# Patient Record
Sex: MALE | Race: WHITE | NOT HISPANIC OR LATINO | Employment: OTHER | URBAN - METROPOLITAN AREA
[De-identification: names, ages, dates, MRNs, and addresses within clinical notes are randomized per-mention and may not be internally consistent; named-entity substitution may affect disease eponyms.]

---

## 2017-09-30 ENCOUNTER — HOSPITAL ENCOUNTER (EMERGENCY)
Facility: HOSPITAL | Age: 27
Discharge: HOME | End: 2017-10-01
Attending: EMERGENCY MEDICINE

## 2017-09-30 DIAGNOSIS — J02.9 PHARYNGITIS, UNSPECIFIED ETIOLOGY: Primary | ICD-10-CM

## 2017-09-30 DIAGNOSIS — D72.829 LEUKOCYTOSIS, UNSPECIFIED TYPE: ICD-10-CM

## 2017-09-30 DIAGNOSIS — Z72.0 TOBACCO ABUSE: ICD-10-CM

## 2017-09-30 DIAGNOSIS — R11.10 VOMITING: ICD-10-CM

## 2017-09-30 PROCEDURE — 96367 TX/PROPH/DG ADDL SEQ IV INF: CPT

## 2017-09-30 PROCEDURE — 99285 EMERGENCY DEPT VISIT HI MDM: CPT | Mod: 25

## 2017-09-30 PROCEDURE — 96365 THER/PROPH/DIAG IV INF INIT: CPT

## 2017-09-30 PROCEDURE — 96375 TX/PRO/DX INJ NEW DRUG ADDON: CPT

## 2017-09-30 RX ORDER — MORPHINE SULFATE 4 MG/ML
4 INJECTION, SOLUTION INTRAMUSCULAR; INTRAVENOUS
Status: COMPLETED | OUTPATIENT
Start: 2017-10-01 | End: 2017-10-01

## 2017-09-30 RX ORDER — PANTOPRAZOLE SODIUM 40 MG/10ML
40 INJECTION, POWDER, LYOPHILIZED, FOR SOLUTION INTRAVENOUS
Status: COMPLETED | OUTPATIENT
Start: 2017-10-01 | End: 2017-10-01

## 2017-10-01 VITALS
BODY MASS INDEX: 24.25 KG/M2 | OXYGEN SATURATION: 96 % | DIASTOLIC BLOOD PRESSURE: 65 MMHG | HEART RATE: 95 BPM | RESPIRATION RATE: 18 BRPM | TEMPERATURE: 98 F | SYSTOLIC BLOOD PRESSURE: 108 MMHG | WEIGHT: 160 LBS | HEIGHT: 68 IN

## 2017-10-01 LAB
ALBUMIN SERPL BCP-MCNC: 3.9 G/DL
ALP SERPL-CCNC: 81 U/L
ALT SERPL W/O P-5'-P-CCNC: 14 U/L
ANION GAP SERPL CALC-SCNC: 14 MMOL/L
AST SERPL-CCNC: 19 U/L
BASOPHILS # BLD AUTO: 0.04 K/UL
BASOPHILS NFR BLD: 0.2 %
BILIRUB SERPL-MCNC: 0.6 MG/DL
BILIRUB UR QL STRIP: NEGATIVE
BUN SERPL-MCNC: 14 MG/DL
CALCIUM SERPL-MCNC: 9.4 MG/DL
CHLORIDE SERPL-SCNC: 98 MMOL/L
CLARITY UR: CLEAR
CO2 SERPL-SCNC: 20 MMOL/L
COLOR UR: YELLOW
CREAT SERPL-MCNC: 1 MG/DL
DEPRECATED S PYO AG THROAT QL EIA: NEGATIVE
DIFFERENTIAL METHOD: ABNORMAL
EOSINOPHIL # BLD AUTO: 0 K/UL
EOSINOPHIL NFR BLD: 0 %
ERYTHROCYTE [DISTWIDTH] IN BLOOD BY AUTOMATED COUNT: 13.1 %
EST. GFR  (AFRICAN AMERICAN): >60 ML/MIN/1.73 M^2
EST. GFR  (NON AFRICAN AMERICAN): >60 ML/MIN/1.73 M^2
GLUCOSE SERPL-MCNC: 118 MG/DL
GLUCOSE UR QL STRIP: NEGATIVE
HCT VFR BLD AUTO: 43.6 %
HGB BLD-MCNC: 15.7 G/DL
HGB UR QL STRIP: ABNORMAL
KETONES UR QL STRIP: ABNORMAL
LACTATE SERPL-SCNC: 0.9 MMOL/L
LEUKOCYTE ESTERASE UR QL STRIP: NEGATIVE
LIPASE SERPL-CCNC: 12 U/L
LYMPHOCYTES # BLD AUTO: 1.2 K/UL
LYMPHOCYTES NFR BLD: 5.2 %
MCH RBC QN AUTO: 29.3 PG
MCHC RBC AUTO-ENTMCNC: 36 G/DL
MCV RBC AUTO: 82 FL
MONOCYTES # BLD AUTO: 1.8 K/UL
MONOCYTES NFR BLD: 7.4 %
NEUTROPHILS # BLD AUTO: 21 K/UL
NEUTROPHILS NFR BLD: 87.7 %
NITRITE UR QL STRIP: NEGATIVE
PH UR STRIP: 6 [PH] (ref 5–8)
PLATELET # BLD AUTO: 164 K/UL
PMV BLD AUTO: 11.6 FL
POTASSIUM SERPL-SCNC: 3.7 MMOL/L
PROT SERPL-MCNC: 7.8 G/DL
PROT UR QL STRIP: ABNORMAL
RBC # BLD AUTO: 5.35 M/UL
SODIUM SERPL-SCNC: 132 MMOL/L
SP GR UR STRIP: <=1.005 (ref 1–1.03)
URN SPEC COLLECT METH UR: ABNORMAL
UROBILINOGEN UR STRIP-ACNC: NEGATIVE EU/DL
WBC # BLD AUTO: 24.05 K/UL

## 2017-10-01 PROCEDURE — 87086 URINE CULTURE/COLONY COUNT: CPT

## 2017-10-01 PROCEDURE — 80053 COMPREHEN METABOLIC PANEL: CPT

## 2017-10-01 PROCEDURE — 63600175 PHARM REV CODE 636 W HCPCS: Performed by: EMERGENCY MEDICINE

## 2017-10-01 PROCEDURE — 25000003 PHARM REV CODE 250: Performed by: EMERGENCY MEDICINE

## 2017-10-01 PROCEDURE — 83690 ASSAY OF LIPASE: CPT

## 2017-10-01 PROCEDURE — 87147 CULTURE TYPE IMMUNOLOGIC: CPT

## 2017-10-01 PROCEDURE — 85025 COMPLETE CBC W/AUTO DIFF WBC: CPT

## 2017-10-01 PROCEDURE — 81003 URINALYSIS AUTO W/O SCOPE: CPT

## 2017-10-01 PROCEDURE — 87040 BLOOD CULTURE FOR BACTERIA: CPT

## 2017-10-01 PROCEDURE — 87081 CULTURE SCREEN ONLY: CPT

## 2017-10-01 PROCEDURE — 25500020 PHARM REV CODE 255: Performed by: EMERGENCY MEDICINE

## 2017-10-01 PROCEDURE — C9113 INJ PANTOPRAZOLE SODIUM, VIA: HCPCS | Performed by: EMERGENCY MEDICINE

## 2017-10-01 PROCEDURE — 87880 STREP A ASSAY W/OPTIC: CPT

## 2017-10-01 PROCEDURE — 83605 ASSAY OF LACTIC ACID: CPT

## 2017-10-01 RX ORDER — ONDANSETRON 4 MG/1
4 TABLET, ORALLY DISINTEGRATING ORAL EVERY 8 HOURS PRN
Qty: 12 TABLET | Refills: 0 | Status: SHIPPED | OUTPATIENT
Start: 2017-10-01

## 2017-10-01 RX ORDER — FAMOTIDINE 20 MG/1
20 TABLET, FILM COATED ORAL 2 TIMES DAILY
Qty: 60 TABLET | Refills: 0 | Status: SHIPPED | OUTPATIENT
Start: 2017-10-01 | End: 2017-10-31

## 2017-10-01 RX ORDER — ACETAMINOPHEN 500 MG
500 TABLET ORAL
Status: COMPLETED | OUTPATIENT
Start: 2017-10-01 | End: 2017-10-01

## 2017-10-01 RX ADMIN — SODIUM CHLORIDE 2000 ML: 0.9 INJECTION, SOLUTION INTRAVENOUS at 12:10

## 2017-10-01 RX ADMIN — SODIUM CHLORIDE 500 ML: 900 INJECTION, SOLUTION INTRAVENOUS at 02:10

## 2017-10-01 RX ADMIN — IOHEXOL 40 ML: 350 INJECTION, SOLUTION INTRAVENOUS at 02:10

## 2017-10-01 RX ADMIN — MORPHINE SULFATE 4 MG: 4 INJECTION, SOLUTION INTRAMUSCULAR; INTRAVENOUS at 12:10

## 2017-10-01 RX ADMIN — ACETAMINOPHEN 500 MG: 500 TABLET ORAL at 02:10

## 2017-10-01 RX ADMIN — PANTOPRAZOLE SODIUM 40 MG: 40 INJECTION, POWDER, FOR SOLUTION INTRAVENOUS at 12:10

## 2017-10-01 RX ADMIN — CEFTRIAXONE 1 G: 1 INJECTION, SOLUTION INTRAVENOUS at 02:10

## 2017-10-01 RX ADMIN — IOHEXOL 60 ML: 350 INJECTION, SOLUTION INTRAVENOUS at 02:10

## 2017-10-01 RX ADMIN — PROMETHAZINE HYDROCHLORIDE 12.5 MG: 25 INJECTION INTRAMUSCULAR; INTRAVENOUS at 12:10

## 2017-10-01 NOTE — ED PROVIDER NOTES
SCRIBE #1 NOTE: I, Bitatanya Addison, am scribing for, and in the presence of, Mayelin Martinez DO. I have scribed the entire note.      History      Chief Complaint   Patient presents with    Emesis     Pt reports emesis x1 day, +body aches/chills. +sore throat       Review of patient's allergies indicates:  No Known Allergies     HPI   HPI    9/30/2017, 11:26 PM   History obtained from the patient and girlfriend      History of Present Illness: Wellington Gutiérrez is a 27 y.o. male patient who presents to the Emergency Department for emesis which onset suddenly 1 day PTA. Pt currently rates his pain a 10/10 in severity. Symptoms are episodic and moderate in severity. Pt's girlfriend states pt has had multiple episodes of emesis and one episode of black emesis PTA. No mitigating or exacerbating factors reported. Associated sxs include nausea, abdominal pain, generalized myalgias, fatigue, sore throat, chills, and subjective fever. Patient denies any constipation, diarrhea, hematochezia, dysuria, hematuria, frequency, neck pain/stiffness, HA, and all other sxs at this time. No further complaints or concerns at this time.       Arrival mode: Personal vehicle    PCP: Not given      Past Medical History:  History reviewed. No pertinent past medical history.    Past Surgical History:  History reviewed. No pertinent surgical history.      Family History:  History reviewed. No pertinent family history.    Social History:  Social History     Social History Main Topics    Smoking status: Current Every Day Smoker     Packs/day: 0.50     Types: Cigarettes    Smokeless tobacco: Never Used    Alcohol use No    Drug use: Unknown    Sexual activity: unknown       ROS   Review of Systems   Constitutional: Positive for chills, fatigue and fever (subjective).   HENT: Positive for sore throat.    Respiratory: Negative for shortness of breath.    Cardiovascular: Negative for chest pain.   Gastrointestinal: Positive for abdominal pain  "(generalized), nausea and vomiting. Negative for blood in stool, constipation and diarrhea.   Genitourinary: Negative for dysuria, frequency and hematuria.   Musculoskeletal: Positive for myalgias (generalized). Negative for back pain, neck pain and neck stiffness.   Skin: Negative for rash.   Neurological: Negative for weakness and headaches.   Hematological: Does not bruise/bleed easily.   All other systems reviewed and are negative.      Physical Exam      Initial Vitals [09/30/17 2313]   BP Pulse Resp Temp SpO2   124/74 96 20 99.4 °F (37.4 °C) 96 %      MAP       90.67          Physical Exam  Nursing Notes and Vital Signs Reviewed.  Constitutional: Patient is in mild distress. Well-developed and well-nourished.  Head: Atraumatic. Normocephalic.  Eyes: PERRL. EOM intact. Conjunctivae are not pale. No scleral icterus.  ENT: Mucous membranes are dry and cracked. Oropharynx is clear and symmetric. Pharyngeal erythema of soft palate. No drooling. No trismus. However voice sounds "hot potato".   Neck: Supple. Full ROM. No lymphadenopathy. No crepitance.   Cardiovascular: Regular rate. Regular rhythm. No murmurs, rubs, or gallops. Distal pulses are 2+ and symmetric.  Pulmonary/Chest: Tachypnea. Clear to auscultation bilaterally. No wheezing, rales, or rhonchi.  Abdominal: Old surgical scar across abdomen. Soft and non-distended.  There is generalized tenderness.  No rebound, guarding, or rigidity. Good bowel sounds. No masses.  Musculoskeletal: Moves all extremities. No obvious deformities. No edema. No calf tenderness.  Skin: Warm and dry.  Neurological:  Alert, awake, and appropriate.  Normal speech.  No acute focal neurological deficits are appreciated.  Psychiatric: Normal affect. Good eye contact. Appropriate in content.    ED Course    Procedures  ED Vital Signs:  Vitals:    09/30/17 2313 10/01/17 0105 10/01/17 0219 10/01/17 0235   BP: 124/74 110/78  108/62   Pulse: 96 103     Resp: 20 (!) 22     Temp: 99.4 °F " "(37.4 °C) 100.2 °F (37.9 °C) 98.5 °F (36.9 °C)    TempSrc: Oral Oral Oral    SpO2: 96% 97%     Weight: 72.6 kg (160 lb)      Height: 5' 8" (1.727 m)       10/01/17 0236 10/01/17 0319   BP:  108/65   Pulse: 87 95   Resp: 17 18   Temp:  98.4 °F (36.9 °C)   TempSrc:  Oral   SpO2: 98% 96%   Weight:     Height:         Abnormal Lab Results:  Labs Reviewed   CBC W/ AUTO DIFFERENTIAL - Abnormal; Notable for the following:        Result Value    WBC 24.05 (*)     Gran # 21.0 (*)     Mono # 1.8 (*)     Gran% 87.7 (*)     Lymph% 5.2 (*)     All other components within normal limits   COMPREHENSIVE METABOLIC PANEL - Abnormal; Notable for the following:     Sodium 132 (*)     CO2 20 (*)     Glucose 118 (*)     All other components within normal limits   URINALYSIS - Abnormal; Notable for the following:     Specific Gravity, UA <=1.005 (*)     Protein, UA Trace (*)     Ketones, UA 2+ (*)     Occult Blood UA Trace (*)     All other components within normal limits   THROAT SCREEN, RAPID   CULTURE, STREP A,  THROAT   CULTURE, BLOOD   CULTURE, BLOOD   CULTURE, URINE   LIPASE   LACTIC ACID, PLASMA        All Lab Results:  Results for orders placed or performed during the hospital encounter of 09/30/17   Rapid strep screen   Result Value Ref Range    Rapid Strep A Screen Negative Negative   CBC auto differential   Result Value Ref Range    WBC 24.05 (H) 3.90 - 12.70 K/uL    RBC 5.35 4.60 - 6.20 M/uL    Hemoglobin 15.7 14.0 - 18.0 g/dL    Hematocrit 43.6 40.0 - 54.0 %    MCV 82 82 - 98 fL    MCH 29.3 27.0 - 31.0 pg    MCHC 36.0 32.0 - 36.0 g/dL    RDW 13.1 11.5 - 14.5 %    Platelets 164 150 - 350 K/uL    MPV 11.6 9.2 - 12.9 fL    Gran # 21.0 (H) 1.8 - 7.7 K/uL    Lymph # 1.2 1.0 - 4.8 K/uL    Mono # 1.8 (H) 0.3 - 1.0 K/uL    Eos # 0.0 0.0 - 0.5 K/uL    Baso # 0.04 0.00 - 0.20 K/uL    Gran% 87.7 (H) 38.0 - 73.0 %    Lymph% 5.2 (L) 18.0 - 48.0 %    Mono% 7.4 4.0 - 15.0 %    Eosinophil% 0.0 0.0 - 8.0 %    Basophil% 0.2 0.0 - 1.9 %    " Differential Method Automated    Comprehensive metabolic panel   Result Value Ref Range    Sodium 132 (L) 136 - 145 mmol/L    Potassium 3.7 3.5 - 5.1 mmol/L    Chloride 98 95 - 110 mmol/L    CO2 20 (L) 23 - 29 mmol/L    Glucose 118 (H) 70 - 110 mg/dL    BUN, Bld 14 6 - 20 mg/dL    Creatinine 1.0 0.5 - 1.4 mg/dL    Calcium 9.4 8.7 - 10.5 mg/dL    Total Protein 7.8 6.0 - 8.4 g/dL    Albumin 3.9 3.5 - 5.2 g/dL    Total Bilirubin 0.6 0.1 - 1.0 mg/dL    Alkaline Phosphatase 81 55 - 135 U/L    AST 19 10 - 40 U/L    ALT 14 10 - 44 U/L    Anion Gap 14 8 - 16 mmol/L    eGFR if African American >60 >60 mL/min/1.73 m^2    eGFR if non African American >60 >60 mL/min/1.73 m^2   Lipase   Result Value Ref Range    Lipase 12 4 - 60 U/L   Urinalysis   Result Value Ref Range    Specimen UA Urine, Clean Catch     Color, UA Yellow Yellow, Straw, Yoon    Appearance, UA Clear Clear    pH, UA 6.0 5.0 - 8.0    Specific Gravity, UA <=1.005 (A) 1.005 - 1.030    Protein, UA Trace (A) Negative    Glucose, UA Negative Negative    Ketones, UA 2+ (A) Negative    Bilirubin (UA) Negative Negative    Occult Blood UA Trace (A) Negative    Nitrite, UA Negative Negative    Urobilinogen, UA Negative <2.0 EU/dL    Leukocytes, UA Negative Negative   Lactic acid, plasma   Result Value Ref Range    Lactate (Lactic Acid) 0.9 0.5 - 2.2 mmol/L       Imaging Results:  Imaging Results          CT Abdomen Pelvis With Contrast (In process)                CT Chest With Contrast (In process)                CT Soft Tissue Neck With Contrast (In process)                2:40 AM: Per STAT radiology, pt's Neck CT results: Prominence is noted of the palatine tonsils. No definite evidence for low density collection to suggest an abscess at this time. Multiple mildly prominent nonspecific nodes along the jugular chains and posterior cervical triangles.     2:54 AM:  Per STAT radiology, pt's CT results:  CT Chest: Mild atelectasis at the dependent aspects of the lungs.  Nonspecific 4 mm soft tissue density nodule suspected in the right lower lobe. Follow-up per final report.  CT Abdomen and Pelvis: Evaluation of the stomach is limited secondary to poor distension. Some wall thickening of the stomach cannot be excluded.         The Emergency Provider reviewed the vital signs and test results, which are outlined above.    ED Discussion     01:20 AM:  Patient now with SIRS criteria, reviewed WBC. Unknown source of infection identified as this time:  No sepsis  - symptoms are suggestive of gastroenteritis as patient has has multiple episodes of vomiting with blood tinged. Will add blood cultures, lactic acid, urine culture, and rocephin.  DD - gastroenteritis, retropharyngeal abscess, gastritis, mediastinal air, perforated ulcer.   Will continue to monitor clinic course.    01:47 AM: Patient and girlfriend updated on results. Informed pt and friend of elevated liver enzymes and discussed precautions.      3:13 AM: There is no source of infection. No air in mediastinum. No free air. No signs of sepsis at this point in time. No severe dehydration. Reassessed pt at this time. No drooling. Repeat Abdominal Exam:  Soft and non-distended.  There is no tenderness. No rebound, guarding, or rigidity. No organomegaly. No pulsatile mass. Normal bowel sounds.  Pt states his condition has improved at this time. Discussed with pt all pertinent ED information and results. Discussed pt dx and plan of tx. Gave pt all f/u and return to the ED instructions. All questions and concerns were addressed at this time. Pt expresses understanding of information and instructions, and is comfortable with plan to discharge. Pt is stable for discharge.    I discussed with patient and/or family/caretaker that evaluation in the ED does not suggest any emergent or life threatening medical conditions requiring immediate intervention beyond what was provided in the ED, and I believe patient is safe for discharge.   Regardless, an unremarkable evaluation in the ED does not preclude the development or presence of a serious of life threatening condition. As such, patient was instructed to return immediately for any worsening or change in current symptoms.      ED Medication(s):  Medications   sodium chloride 0.9% bolus 2,000 mL (0 mLs Intravenous Stopped 10/1/17 0105)   promethazine (PHENERGAN) 12.5 mg in dextrose 5 % 50 mL IVPB (0 mg Intravenous Stopped 10/1/17 0040)   morphine injection 4 mg (4 mg Intravenous Given 10/1/17 0016)   pantoprazole injection 40 mg (40 mg Intravenous Given 10/1/17 0016)   acetaminophen tablet 500 mg (500 mg Oral Given 10/1/17 0219)   sodium chloride 0.9% bolus 500 mL (0 mLs Intravenous Stopped 10/1/17 0301)   cefTRIAXone (ROCEPHIN) 1 g in dextrose 5 % 50 mL IVPB (0 g Intravenous Stopped 10/1/17 0301)   omnipaque 350 iohexol 40 mL (40 mLs Intravenous Given 10/1/17 0210)   omnipaque 350 iohexol 60 mL (60 mLs Intravenous Given 10/1/17 0211)       New Prescriptions    ACETAMINOPHEN-CODEINE 120-12 MG/5 ML SUSPENSION    Take 10 mLs by mouth every 6 (six) hours as needed for Pain.    FAMOTIDINE (PEPCID) 20 MG TABLET    Take 1 tablet (20 mg total) by mouth 2 (two) times daily.    ONDANSETRON (ZOFRAN-ODT) 4 MG TBDL    Take 1 tablet (4 mg total) by mouth every 8 (eight) hours as needed.       Follow-up Information     [facility] -  In 1 day.    Specialty:  Emergency Medicine  Why:  Avoid red sauces, caffiene, tobacco, NSAIDS.  Return to the ED for:  nausea, vomiting, blood in stool, weakness, fever, confusion  Contact information:  [address]  [facility] [address] [phone]           Primary Care Provider of Choice. Schedule an appointment as soon as possible for a visit in 2 days.    Contact information:  [phone] for appointment.                   Medical Decision Making    Medical Decision Making:   Clinical Tests:   Lab Tests: Ordered and  Reviewed  Radiological Study: Ordered and Reviewed     Additional MDM:   Smoking Cessation: The patient is a smoker. The patient was counseled on smoking cessation for: 3 minutes. The patient was counseled on tobacco related  health complications.        Scribe Attestation:   Scribe #1: I performed the above scribed service and the documentation accurately describes the services I performed. I attest to the accuracy of the note.    Attending:   Physician Attestation Statement for Scribe #1: I, Mayelin Martinez DO, personally performed the services described in this documentation, as scribed by Bita Addison, in my presence, and it is both accurate and complete.          Clinical Impression       ICD-10-CM ICD-9-CM   1. Pharyngitis, unspecified etiology J02.9 462   2. Vomiting R11.10 787.03   3. Leukocytosis, unspecified type D72.829 288.60   4. Tobacco abuse Z72.0 305.1       Disposition:   Disposition: Discharged  Condition: Stable         Mayelin Martinez DO  10/01/17 0322

## 2017-10-03 LAB
BACTERIA THROAT CULT: NORMAL
BACTERIA UR CULT: NO GROWTH

## 2017-10-06 LAB
BACTERIA BLD CULT: NORMAL
BACTERIA BLD CULT: NORMAL

## 2018-05-30 ENCOUNTER — HOSPITAL ENCOUNTER (EMERGENCY)
Facility: HOSPITAL | Age: 28
Discharge: HOME | End: 2018-05-30
Attending: EMERGENCY MEDICINE

## 2018-05-30 VITALS
RESPIRATION RATE: 16 BRPM | DIASTOLIC BLOOD PRESSURE: 77 MMHG | TEMPERATURE: 97 F | OXYGEN SATURATION: 97 % | HEART RATE: 84 BPM | SYSTOLIC BLOOD PRESSURE: 132 MMHG

## 2018-05-30 DIAGNOSIS — K08.89 TOOTHACHE: Primary | ICD-10-CM

## 2018-05-30 DIAGNOSIS — K02.9 DENTAL CARIES: ICD-10-CM

## 2018-05-30 PROCEDURE — 99283 EMERGENCY DEPT VISIT LOW MDM: CPT

## 2018-05-30 RX ORDER — NAPROXEN 375 MG/1
375 TABLET ORAL 2 TIMES DAILY WITH MEALS
Qty: 30 TABLET | Refills: 0 | Status: SHIPPED | OUTPATIENT
Start: 2018-05-30

## 2018-05-30 RX ORDER — PENICILLIN V POTASSIUM 500 MG/1
500 TABLET, FILM COATED ORAL 4 TIMES DAILY
Qty: 40 TABLET | Refills: 0 | Status: SHIPPED | OUTPATIENT
Start: 2018-05-30 | End: 2018-06-06

## 2018-05-30 RX ORDER — HYDROCODONE BITARTRATE AND ACETAMINOPHEN 5; 325 MG/1; MG/1
1 TABLET ORAL EVERY 4 HOURS PRN
Qty: 11 TABLET | Refills: 0 | Status: SHIPPED | OUTPATIENT
Start: 2018-05-30 | End: 2018-06-09

## 2018-05-30 NOTE — ED PROVIDER NOTES
SCRIBE #1 NOTE: I, Corinne Mack, am scribing for, and in the presence of, Sunil Clark MD. I have scribed the entire note.      History      Chief Complaint   Patient presents with    Jaw Pain     pain to left side of jaw x 2 days; relieved by cold water       Review of patient's allergies indicates:  No Known Allergies     HPI   HPI    5/30/2018, 11:03 AM   History obtained from the patient      History of Present Illness: Wellington Gutiérrez is a 28 y.o. male patient who presents to the Emergency Department for L jaw pain which onset gradually 2 days ago. Symptoms are constant and mild in severity. Drinking cold water alleviates the pain. No exacerbating factors reported. No associated sxs reported. Patient denies any fever, chills, drooling, facial swelling, HA, and all other sxs at this time. No prior Tx reported. No further complaints or concerns at this time.         Arrival mode: Personal vehicle    PCP: Primary Doctor No       Past Medical History:  Past medical history reviewed not relevant      Past Surgical History:  Past surgical history reviewed not relevant      Family History:  Family history reviewed not relevant      Social History:  Social History    Social History Main Topics    Social History Main Topics    Smoking status: Unknown if ever smoked    Smokeless tobacco: Unknown if ever used    Alcohol Use: Unknown drinking history    Drug Use: Unknown if ever used    Sexual Activity: Unknown       ROS   Review of Systems   Constitutional: Negative for chills and fever.   HENT: Negative for drooling and facial swelling.         (+) jaw pain   Respiratory: Negative for cough and shortness of breath.    Cardiovascular: Negative for chest pain and leg swelling.   Gastrointestinal: Negative for abdominal pain, diarrhea, nausea and vomiting.   Musculoskeletal: Negative for back pain, neck pain and neck stiffness.   Skin: Negative for rash and wound.   Neurological: Negative for dizziness,  light-headedness, numbness and headaches.   All other systems reviewed and are negative.    Physical Exam      Initial Vitals [05/30/18 1049]   BP Pulse Resp Temp SpO2   132/77 84 16 97.1 °F (36.2 °C) 97 %      MAP       95.33          Physical Exam  Nursing Notes and Vital Signs Reviewed.  Constitutional: Patient is in no apparent distress. Well-developed and well-nourished.  Head: Atraumatic. Normocephalic.  Mouth/Throat: No evident facial swelling. Patient handles secretions normally. Positive for dental caries. Negative for gingival edema. No palpable fluctuance. No evidence of periodontal or periapical abscess. No trismus.   Eyes: PERRL. EOM intact. Conjunctivae are not pale. No scleral icterus.  ENT: Mucous membranes are moist. Oropharynx is clear and symmetric.    Neck: Supple. Full ROM. No lymphadenopathy.  Cardiovascular: Regular rate. Regular rhythm. No murmurs, rubs, or gallops. Distal pulses are 2+ and symmetric.  Pulmonary/Chest: No respiratory distress. Clear to auscultation bilaterally. No wheezing or rales.  Abdominal: Soft and non-distended.  There is no tenderness.  No rebound, guarding, or rigidity.   Musculoskeletal: Moves all extremities. No obvious deformities. No edema.  Skin: Warm and dry.  Neurological:  Alert, awake, and appropriate.  Normal speech.  No acute focal neurological deficits are appreciated.  Psychiatric: Normal affect. Good eye contact. Appropriate in content.    ED Course    Procedures  ED Vital Signs:  Vitals:    05/30/18 1049   BP: 132/77   Pulse: 84   Resp: 16   Temp: 97.1 °F (36.2 °C)   TempSrc: Axillary   SpO2: 97%       Abnormal Lab Results:  Labs Reviewed - No data to display     All Lab Results:  None    Imaging Results:  Imaging Results    None                 The Emergency Provider reviewed the vital signs and test results, which are outlined above.    ED Discussion     11:05 AM: Pt is awake, alert, and in no distress. Discussed pt dx and plan of tx. Gave pt all f/u  and return to the ED instructions. All questions and concerns were addressed at this time. Pt expresses understanding of information and instructions, and is comfortable with plan to discharge. Pt is stable for discharge.    I discussed with patient and/or family/caretaker that evaluation in the ED does not suggest any emergent or life threatening medical conditions requiring immediate intervention beyond what was provided in the ED, and I believe patient is safe for discharge.  Regardless, an unremarkable evaluation in the ED does not preclude the development or presence of a serious of life threatening condition. As such, patient was instructed to return immediately for any worsening or change in current symptoms.      ED Medication(s):  Medications - No data to display    Discharge Medication List as of 5/30/2018 11:02 AM      START taking these medications    Details   HYDROcodone-acetaminophen (NORCO) 5-325 mg per tablet Take 1 tablet by mouth every 4 (four) hours as needed for Pain., Starting Wed 5/30/2018, Until Sat 6/9/2018, Print      naproxen (NAPROSYN) 375 MG tablet Take 1 tablet (375 mg total) by mouth 2 (two) times daily with meals., Starting Wed 5/30/2018, Print      penicillin v potassium (VEETID) 500 MG tablet Take 1 tablet (500 mg total) by mouth 4 (four) times daily., Starting Wed 5/30/2018, Until Wed 6/6/2018, Print             Follow-up Information     Dentist In 2 days.                   Medical Decision Making              Scribe Attestation:   Scribe #1: I performed the above scribed service and the documentation accurately describes the services I performed. I attest to the accuracy of the note.    Attending:   Physician Attestation Statement for Scribe #1: I, Sunil Clark MD, personally performed the services described in this documentation, as scribed by Corinne Mack, in my presence, and it is both accurate and complete.          Clinical Impression       ICD-10-CM ICD-9-CM   1.  Toothache K08.89 525.9   2. Dental caries K02.9 521.00       Disposition:   Disposition: Discharged  Condition: Stable           Sunil Clark MD  05/30/18 114